# Patient Record
Sex: FEMALE | Race: WHITE | NOT HISPANIC OR LATINO | Employment: FULL TIME | URBAN - METROPOLITAN AREA
[De-identification: names, ages, dates, MRNs, and addresses within clinical notes are randomized per-mention and may not be internally consistent; named-entity substitution may affect disease eponyms.]

---

## 2023-10-28 PROBLEM — L57.9 SKIN CHANGES DUE TO CHRONIC EXPOSURE TO NONIONIZING RADIATION, UNSPECIFIED: Status: ACTIVE | Noted: 2022-10-26

## 2023-10-28 PROBLEM — D22.5 MELANOCYTIC NEVI OF TRUNK: Status: ACTIVE | Noted: 2022-10-26

## 2023-10-28 PROBLEM — D22.62 MELANOCYTIC NEVI OF LEFT UPPER LIMB, INCLUDING SHOULDER: Status: ACTIVE | Noted: 2022-10-26

## 2023-10-28 PROBLEM — D22.71 MELANOCYTIC NEVI OF RIGHT LOWER LIMB, INCLUDING HIP: Status: ACTIVE | Noted: 2022-10-26

## 2023-10-28 PROBLEM — D22.72 MELANOCYTIC NEVI OF LEFT LOWER LIMB, INCLUDING HIP: Status: ACTIVE | Noted: 2022-10-26

## 2023-10-28 PROBLEM — L81.4 OTHER MELANIN HYPERPIGMENTATION: Status: ACTIVE | Noted: 2022-10-26

## 2023-10-28 PROBLEM — L57.0 ACTINIC KERATOSIS: Status: ACTIVE | Noted: 2022-10-26

## 2023-10-28 PROBLEM — D22.61 MELANOCYTIC NEVI OF RIGHT UPPER LIMB, INCLUDING SHOULDER: Status: ACTIVE | Noted: 2022-10-26

## 2023-10-28 PROBLEM — D18.01 HEMANGIOMA OF SKIN AND SUBCUTANEOUS TISSUE: Status: ACTIVE | Noted: 2022-10-26

## 2023-11-01 ENCOUNTER — OFFICE VISIT (OUTPATIENT)
Dept: DERMATOLOGY | Facility: CLINIC | Age: 43
End: 2023-11-01
Payer: COMMERCIAL

## 2023-11-01 DIAGNOSIS — D22.9 MULTIPLE BENIGN NEVI: ICD-10-CM

## 2023-11-01 DIAGNOSIS — L81.4 LENTIGO: ICD-10-CM

## 2023-11-01 DIAGNOSIS — L57.8 DIFFUSE PHOTODAMAGE OF SKIN: Primary | ICD-10-CM

## 2023-11-01 DIAGNOSIS — D18.01 HEMANGIOMA OF SKIN: ICD-10-CM

## 2023-11-01 PROCEDURE — 99213 OFFICE O/P EST LOW 20 MIN: CPT | Performed by: STUDENT IN AN ORGANIZED HEALTH CARE EDUCATION/TRAINING PROGRAM

## 2023-11-01 PROCEDURE — 1036F TOBACCO NON-USER: CPT | Performed by: STUDENT IN AN ORGANIZED HEALTH CARE EDUCATION/TRAINING PROGRAM

## 2023-11-01 NOTE — PROGRESS NOTES
Subjective   Yoana Quiñones is a 43 y.o. female who presents for the following: Skin Check (Last FBSE 10/26/22. No concerning lesions today.)    Skin Cancer History  No personal history of skin cancer  Fair skin with hx moderate sun exposure  Father with history of SCC     Skin Cancer Screening  She has a history of moderate (wore sunscreen often) sun exposure. She is in the sun infrequently. She uses sunscreen daily. She reports no skin symptoms. Her moles are not changing.    Spots that concern her : None.      The following portions of the chart were reviewed this encounter and updated as appropriate:         Review of Systems: No other skin or systemic complaints.    Objective   Well appearing patient in no apparent distress; mood and affect are within normal limits.    A full examination was performed including scalp, head, eyes, ears, nose, lips, neck, chest, axillae, abdomen, back, buttocks, bilateral upper extremities, bilateral lower extremities, hands, feet, fingers, toes, fingernails, and toenails. All findings within normal limits unless otherwise noted below.    Scattered cherry-red papule(s).    Scattered tan macules in sun-exposed areas.    Scattered, uniform and benign-appearing, regular brown melanocytic papules and macules.    Mottled pigmentation and telangiectasias consistent with sun damage      Assessment/Plan   Hemangioma of skin    Reassured of benign nature of lesions    Lentigo    Reassured of benign nature of lesions    Multiple benign nevi    Reassured of benign nature of lesions  Reviewed ABCDEs, handout provided    Diffuse photodamage of skin    Encouraged sun protection with opting for shade when feasible, wearing sun protective clothing, and/or using sunscreen SPF 30-50+ daily.         RTC yearly or earlier prn for new/changing/concerning lesions    Scribe Attestation  By signing my name below, IYael LPN , Scribe   attest that this documentation has been prepared under the  direction and in the presence of Hardik Tejeda MD.

## 2024-11-05 NOTE — PATIENT INSTRUCTIONS
"WHAT TO LOOK FOR: ABCDES OF MELANOMA:    A is for Asymmetry  One half of the spot is unlike the other half.    B is for Border  The spot has an irregular, scalloped, or poorly defined border.    C is for Color  The spot has varying colors from one area to the next, such as shades of tan, brown or black, or areas of white, red, or blue.    D is for Diameter  While melanomas are usually greater than 6 millimeters, or about the size of a pencil eraser, when diagnosed, they can be smaller.    E is for Evolving  The spot looks different from the rest or is changing in size, shape, or color.    SUNSCREEN AND SUN PROTECTION    Ultraviolet radiation from the sun is the main cause of skin cancer as well as sun damage (brown spots, wrinkles and more).  Your best protection from the sun is to stay out of the mid-day sun (from 10am-3pm), seek shade, and cover your skin with clothing and hats.  Wear a swim shirt when swimming.  Sunscreen should be used to areas that aren't covered, including lips.    We prefer sunscreens that are SPF 30 or higher.  Sunscreens should be applied liberally and reapplied every 2 hours, more often when swimming or sweating.    If you will be sweating or swimming, choose a sunscreen that is labeled \"Water resistant 80 minutes\".  This is the highest waterproof rating from the FDA.      Use a moisturizer with sunscreen daily to protect your sun-exposed areas such as the face, neck and backs of hands.  Some drugstore brands to try are Neutrogena Healthy Defense Daily Moisturizer (PureScreen) SPF 50 or CeraVe Face Lotion Invisible Zinc SPF 50.  Elta MD products are slightly more expensive and must be ordered through [employer] or the [employer] website.  We like their UV Daily or UV Clear.      For body sunscreen when doing outdoor activity, some to try include Sun Bum products, Aveeno Baby Continuous Protection SPF 50 for sensitive skin, Blue Lizard SPF 30+, All Good sport sunscreen SPF 50, or Banana Boat Simply " Protect Sport Sunscreen lotion spf 50.  Sticks, gels, and sprays are also great and can be used for areas of the body that are difficult to cover with lotion.    If you have brown spots such as melasma or lentigenes, choose a tinted sunscreen.  There are ingredients in tinted sunscreens (iron oxide) that do a better job blocking certain types of light that cause brown spots.  We like Elta MD UV Clear tinted or Elta MD UV Daily tinted, which can be ordered on [employer] or from [employer]. You can also try Coola Mineral Face Matte Moisturizer SPF 30 or Australian Gold Botaniacal Suncreen SPF 50 Tinted Face Mineral Lotion.    There are two types of sunscreens: Chemical sunscreens, such as those that contain the ingredients avobenzone and oxybenzone, and Physical sunscreens, such as those that contain Zinc oxide and Titanium dioxide. Chemical sunscreens absorb light and absorb into the skin.  They must be applied 15 minutes before sun exposure.  Physical sunscreens reflect the light and are not absorbed into the skin.  They should be applied 5 minutes before sun exposure.  Some patients worry about the effects of sunscreens that are absorbed into the skin.  If you are worried about this, use the physical (zinc/titanium sunscreens)- look at the label before buying.  There is lots of scientific evidence that sunlight causes cancer, but there is no direct evidence that sunscreens are harmful.  However, the FDA has asked for further study of the chemical sunscreens to make sure they do not have any health effects on humans.

## 2024-11-05 NOTE — PROGRESS NOTES
Subjective   Yoana Quiñones is a 44 y.o. female who presents for the following: Skin Check (LV: 11/1/23: FBSE.  No bleeding, changing, painful or itchy lesions today.)    Skin Cancer History  None    Family History of Skin Cancer  Father with history of SCC     The following portions of the chart were reviewed this encounter and updated as appropriate:         Review of Systems: Pertinent items are noted in HPI.    Objective   Well appearing patient in no apparent distress; mood and affect are within normal limits.    A full examination was performed including scalp, head, eyes, ears, nose, lips, neck, chest, axillae, abdomen, back, buttocks, bilateral upper extremities, bilateral lower extremities, hands, feet, fingers, toes, fingernails, and toenails. All findings within normal limits unless otherwise noted below.    Mottled pigmentation and telangiectasias consistent with sun damage    Scattered cherry-red papule(s).    Scattered tan macules in sun-exposed areas.    Scattered, uniform and benign-appearing, regular brown melanocytic papules and macules.    Head - Anterior (Face)  Hyperpigmented patches on b/l cheeks                    Assessment/Plan   Melasma  Head - Anterior (Face)    Discussed nature of condition, which may be related to a combination of predisposition, female hormones and sunlight. Advised sun protective behaviors including the use of suncreen with SPF 30+ daily. Discussed options including retinoid vs hydroquinone. She prefers to try retinoid first.     Advised to start using tretinoin 0.025% cream every night. Advised to start slowly, applying a pea-sized amount 1-2 times per week, then slowly increasing up to every night as tolerated. Discussed risk of dryness with tretinoin, and advised that patient may apply a non-comedogenic moisturizer prn to help combat dryness. Discussed that it can take months before seeing improvement, and advised that patience and adherance to the recommended regimen  is critical to see improvement. Patient verbalized understanding and agreement with plan      Related Procedures  Follow Up In Dermatology - Established Patient    Diffuse photodamage of skin    Encouraged sun protection with opting for shade when feasible, wearing sun protective clothing, and/or using sunscreen SPF 30-50+ daily.     Related Procedures  Follow Up In Dermatology - Established Patient    Hemangioma of skin    Reassured of benign nature of lesions    Lentigo    Reassured of benign nature of lesions    Multiple benign nevi    Reassured of benign nature of lesions  Reviewed ABCDEs, handout provided    Continue to follow up yearly or earlier prn for melasma.  For routine FBSE, may follow up every few years (or up to yearly if she prefers) or earlier prn for new/changing/concerning lesions     Scribe Attestation  By signing my name below, I, Yael Olson LPN , Scribe   attest that this documentation has been prepared under the direction and in the presence of Hardik Tejeda MD.

## 2024-11-06 ENCOUNTER — APPOINTMENT (OUTPATIENT)
Dept: DERMATOLOGY | Facility: CLINIC | Age: 44
End: 2024-11-06
Payer: COMMERCIAL

## 2024-11-06 DIAGNOSIS — L81.4 LENTIGO: ICD-10-CM

## 2024-11-06 DIAGNOSIS — D18.01 HEMANGIOMA OF SKIN: ICD-10-CM

## 2024-11-06 DIAGNOSIS — D22.9 MULTIPLE BENIGN NEVI: ICD-10-CM

## 2024-11-06 DIAGNOSIS — L57.8 DIFFUSE PHOTODAMAGE OF SKIN: ICD-10-CM

## 2024-11-06 DIAGNOSIS — L81.1 MELASMA: Primary | ICD-10-CM

## 2024-11-06 PROCEDURE — 99214 OFFICE O/P EST MOD 30 MIN: CPT | Performed by: STUDENT IN AN ORGANIZED HEALTH CARE EDUCATION/TRAINING PROGRAM

## 2024-11-06 RX ORDER — TRETINOIN 0.25 MG/G
CREAM TOPICAL
Qty: 20 G | Refills: 5 | Status: SHIPPED | OUTPATIENT
Start: 2024-11-06

## 2025-11-12 ENCOUNTER — APPOINTMENT (OUTPATIENT)
Dept: DERMATOLOGY | Facility: CLINIC | Age: 45
End: 2025-11-12
Payer: COMMERCIAL